# Patient Record
Sex: MALE | Race: WHITE | NOT HISPANIC OR LATINO | URBAN - METROPOLITAN AREA
[De-identification: names, ages, dates, MRNs, and addresses within clinical notes are randomized per-mention and may not be internally consistent; named-entity substitution may affect disease eponyms.]

---

## 2021-10-04 ENCOUNTER — OFFICE VISIT (OUTPATIENT)
Dept: URBAN - METROPOLITAN AREA CLINIC 105 | Facility: CLINIC | Age: 41
End: 2021-10-04
Payer: COMMERCIAL

## 2021-10-04 VITALS
BODY MASS INDEX: 20.4 KG/M2 | SYSTOLIC BLOOD PRESSURE: 139 MMHG | HEART RATE: 76 BPM | HEIGHT: 68 IN | DIASTOLIC BLOOD PRESSURE: 85 MMHG | TEMPERATURE: 97.6 F | WEIGHT: 134.6 LBS

## 2021-10-04 DIAGNOSIS — R19.4 CHANGE IN BOWEL HABIT: ICD-10-CM

## 2021-10-04 PROCEDURE — 99242 OFF/OP CONSLTJ NEW/EST SF 20: CPT | Performed by: INTERNAL MEDICINE

## 2021-10-04 RX ORDER — BACILLUS COAGULANS/INULIN 1B-250 MG
AS DIRECTED CAPSULE ORAL
Status: ACTIVE | COMMUNITY

## 2021-10-04 NOTE — HPI-TODAY'S VISIT:
Pt comes on referral from Dr. Campos Lizama. A copy will be sent to the referring MD. Pt tells me that over the last 4 months he has had change in bowel habits. he says that the stools are somewhat flatter than normal. He says that it is not diarrhea but looser. he says that it is not one piece any more. there is no liquid component at all but he will have a stool with no "bulk".  no blood in the stool. - He denies weight loss or vomiting. no blood in the stool. he has developed some LLQ "achiness". she says that his sister has ulcerative colitis.

## 2021-10-05 ENCOUNTER — OFFICE VISIT (OUTPATIENT)
Dept: URBAN - METROPOLITAN AREA CLINIC 105 | Facility: CLINIC | Age: 41
End: 2021-10-05

## 2021-12-06 ENCOUNTER — OFFICE VISIT (OUTPATIENT)
Dept: URBAN - METROPOLITAN AREA CLINIC 105 | Facility: CLINIC | Age: 41
End: 2021-12-06
Payer: COMMERCIAL

## 2021-12-06 VITALS
DIASTOLIC BLOOD PRESSURE: 82 MMHG | HEART RATE: 71 BPM | SYSTOLIC BLOOD PRESSURE: 141 MMHG | BODY MASS INDEX: 20.88 KG/M2 | HEIGHT: 68 IN | WEIGHT: 137.8 LBS | TEMPERATURE: 97.8 F

## 2021-12-06 DIAGNOSIS — R19.5 LOOSE STOOLS: ICD-10-CM

## 2021-12-06 DIAGNOSIS — Z83.79 FAMILY HISTORY OF INFLAMMATORY BOWEL DISEASE: ICD-10-CM

## 2021-12-06 DIAGNOSIS — R19.4 CHANGE IN BOWEL HABITS: ICD-10-CM

## 2021-12-06 DIAGNOSIS — L29.0 ANAL ITCH: ICD-10-CM

## 2021-12-06 PROCEDURE — 99213 OFFICE O/P EST LOW 20 MIN: CPT | Performed by: INTERNAL MEDICINE

## 2021-12-06 RX ORDER — BACILLUS COAGULANS/INULIN 1B-250 MG
AS DIRECTED CAPSULE ORAL
Status: ACTIVE | COMMUNITY

## 2021-12-06 NOTE — HPI-TODAY'S VISIT:
Pt comes on referral from Dr. Campos Lizama. A copy will be sent to the referring MD. Pt tells me that over the last 4 months he has had change in bowel habits. he says that the stools are somewhat flatter than normal. He says that it is not diarrhea but looser. he says that it is not one piece any more. there is no liquid component at all but he will have a stool with no "bulk".  no blood in the stool. - He denies weight loss or vomiting. no blood in the stool. he has developed some LLQ "achiness". she says that his sister has ulcerative colitis. - 12/7/2021: At his last visit, we offered colonoscopy but he declined. i started him on equalactin that he did not consistently take but he did do the probiotic.

## 2021-12-09 ENCOUNTER — LAB OUTSIDE AN ENCOUNTER (OUTPATIENT)
Dept: URBAN - METROPOLITAN AREA CLINIC 105 | Facility: CLINIC | Age: 41
End: 2021-12-09

## 2021-12-15 LAB
CALPROTECTIN, STOOL - QDX: (no result)
GASTROINTESTINAL PATHOGEN: (no result)
PANCREATICELASTASE ELISA, STOOL: (no result)

## 2022-01-18 ENCOUNTER — OFFICE VISIT (OUTPATIENT)
Dept: URBAN - METROPOLITAN AREA CLINIC 105 | Facility: CLINIC | Age: 42
End: 2022-01-18

## 2022-02-04 ENCOUNTER — OFFICE VISIT (OUTPATIENT)
Dept: URBAN - METROPOLITAN AREA CLINIC 105 | Facility: CLINIC | Age: 42
End: 2022-02-04
Payer: COMMERCIAL

## 2022-02-04 ENCOUNTER — WEB ENCOUNTER (OUTPATIENT)
Dept: URBAN - METROPOLITAN AREA CLINIC 105 | Facility: CLINIC | Age: 42
End: 2022-02-04

## 2022-02-04 VITALS
DIASTOLIC BLOOD PRESSURE: 85 MMHG | WEIGHT: 141.6 LBS | SYSTOLIC BLOOD PRESSURE: 144 MMHG | BODY MASS INDEX: 21.46 KG/M2 | TEMPERATURE: 96.9 F | HEART RATE: 78 BPM | HEIGHT: 68 IN

## 2022-02-04 DIAGNOSIS — R19.5 CHANGE IN STOOL CALIBER: ICD-10-CM

## 2022-02-04 DIAGNOSIS — R19.4 CHANGE IN STOOL HABITS: ICD-10-CM

## 2022-02-04 PROCEDURE — 99213 OFFICE O/P EST LOW 20 MIN: CPT | Performed by: INTERNAL MEDICINE

## 2022-02-04 RX ORDER — SODIUM, POTASSIUM,MAG SULFATES 17.5-3.13G
177 ML SOLUTION, RECONSTITUTED, ORAL ORAL
Qty: 1 KIT | Refills: 0 | OUTPATIENT
Start: 2022-02-04

## 2022-02-04 RX ORDER — BISACODYL 5 MG
TAKE 4 TABLET, DELAYED RELEASE (ENTERIC COATED) ORAL
Qty: 4 | OUTPATIENT
Start: 2022-02-04 | End: 2022-02-05

## 2022-02-04 RX ORDER — BACILLUS COAGULANS/INULIN 1B-250 MG
AS DIRECTED CAPSULE ORAL
Status: ACTIVE | COMMUNITY

## 2022-02-04 NOTE — HPI-TODAY'S VISIT:
Pt comes on referral from Dr. Campos Lizama. A copy will be sent to the referring MD. Pt tells me that over the last 4 months he has had change in bowel habits. he says that the stools are somewhat flatter than normal. He says that it is not diarrhea but looser. he says that it is not one piece any more. there is no liquid component at all but he will have a stool with no "bulk".  no blood in the stool. - He denies weight loss or vomiting. no blood in the stool. he has developed some LLQ "achiness". she says that his sister has ulcerative colitis. - 12/7/2021: At his last visit, we offered colonoscopy but he declined. i started him on equalactin that he did not consistently take but he did do the probiotic. - 2/4/2022: stool studies

## 2022-02-09 ENCOUNTER — WEB ENCOUNTER (OUTPATIENT)
Dept: URBAN - METROPOLITAN AREA CLINIC 105 | Facility: CLINIC | Age: 42
End: 2022-02-09

## 2022-05-03 PROBLEM — 46202007: Status: ACTIVE | Noted: 2022-05-03

## 2022-05-23 ENCOUNTER — CLAIMS CREATED FROM THE CLAIM WINDOW (OUTPATIENT)
Dept: URBAN - METROPOLITAN AREA SURGERY CENTER 16 | Facility: SURGERY CENTER | Age: 42
End: 2022-05-23

## 2022-05-23 ENCOUNTER — CLAIMS CREATED FROM THE CLAIM WINDOW (OUTPATIENT)
Dept: URBAN - METROPOLITAN AREA SURGERY CENTER 16 | Facility: SURGERY CENTER | Age: 42
End: 2022-05-23
Payer: COMMERCIAL

## 2022-05-23 DIAGNOSIS — R19.4 ALTERATION IN BOWEL ELIMINATION: ICD-10-CM

## 2022-05-23 PROCEDURE — 45378 DIAGNOSTIC COLONOSCOPY: CPT | Performed by: INTERNAL MEDICINE

## 2022-05-23 PROCEDURE — G8907 PT DOC NO EVENTS ON DISCHARG: HCPCS | Performed by: INTERNAL MEDICINE

## 2022-12-13 ENCOUNTER — DASHBOARD ENCOUNTERS (OUTPATIENT)
Age: 42
End: 2022-12-13

## 2022-12-13 ENCOUNTER — WEB ENCOUNTER (OUTPATIENT)
Dept: URBAN - METROPOLITAN AREA CLINIC 105 | Facility: CLINIC | Age: 42
End: 2022-12-13

## 2022-12-13 ENCOUNTER — OFFICE VISIT (OUTPATIENT)
Dept: URBAN - METROPOLITAN AREA CLINIC 105 | Facility: CLINIC | Age: 42
End: 2022-12-13
Payer: COMMERCIAL

## 2022-12-13 VITALS
HEIGHT: 68 IN | HEART RATE: 76 BPM | SYSTOLIC BLOOD PRESSURE: 155 MMHG | BODY MASS INDEX: 21.49 KG/M2 | DIASTOLIC BLOOD PRESSURE: 83 MMHG | WEIGHT: 141.8 LBS | TEMPERATURE: 97.1 F

## 2022-12-13 DIAGNOSIS — R15.1 FECAL SMEARING: ICD-10-CM

## 2022-12-13 DIAGNOSIS — R10.11 RIGHT UPPER QUADRANT PAIN: ICD-10-CM

## 2022-12-13 DIAGNOSIS — R19.4 CHANGE IN BOWEL HABITS: ICD-10-CM

## 2022-12-13 DIAGNOSIS — M25.511 RIGHT SHOULDER PAIN, UNSPECIFIED CHRONICITY: ICD-10-CM

## 2022-12-13 DIAGNOSIS — Z87.19 HISTORY OF IBS: ICD-10-CM

## 2022-12-13 PROCEDURE — 99213 OFFICE O/P EST LOW 20 MIN: CPT | Performed by: INTERNAL MEDICINE

## 2022-12-13 RX ORDER — BACILLUS COAGULANS/INULIN 1B-250 MG
AS DIRECTED CAPSULE ORAL
Status: ON HOLD | COMMUNITY

## 2022-12-13 RX ORDER — SODIUM, POTASSIUM,MAG SULFATES 17.5-3.13G
177 ML SOLUTION, RECONSTITUTED, ORAL ORAL
Qty: 1 KIT | Refills: 0 | Status: ON HOLD | COMMUNITY
Start: 2022-02-04

## 2022-12-13 NOTE — HPI-TODAY'S VISIT:
Pt comes on referral from Dr. Campos Lizama. A copy will be sent to the referring MD. Pt tells me that over the last 4 months he has had change in bowel habits. he says that the stools are somewhat flatter than normal. He says that it is not diarrhea but looser. he says that it is not one piece any more. there is no liquid component at all but he will have a stool with no "bulk".  no blood in the stool. - He denies weight loss or vomiting. no blood in the stool. he has developed some LLQ "achiness". she says that his sister has ulcerative colitis. - 12/7/2021: At his last visit, we offered colonoscopy but he declined. i started him on equalactin that he did not consistently take but he did do the probiotic. - 2/4/2022: stool studies negative - 12/13/2022: in May of 2022 which was normal. we put him on miralax and probiotic. he felt really cleaned out after the colonoscopy for about 2 months. - having RUQ Pain and strong FH of gallbladder issues.

## 2022-12-14 LAB
TISSUE TRANSGLUTAMINASE AB, IGA: <1
TISSUE TRANSGLUTAMINASE AB, IGG: <1

## 2022-12-16 ENCOUNTER — TELEPHONE ENCOUNTER (OUTPATIENT)
Dept: URBAN - METROPOLITAN AREA CLINIC 105 | Facility: CLINIC | Age: 42
End: 2022-12-16

## 2023-01-05 ENCOUNTER — OFFICE VISIT (OUTPATIENT)
Dept: URBAN - METROPOLITAN AREA CLINIC 91 | Facility: CLINIC | Age: 43
End: 2023-01-05
Payer: COMMERCIAL

## 2023-01-05 DIAGNOSIS — K58.1 IBS: ICD-10-CM

## 2023-01-05 PROCEDURE — 76700 US EXAM ABDOM COMPLETE: CPT | Performed by: INTERNAL MEDICINE

## 2023-01-06 PROBLEM — 10743008 IRRITABLE BOWEL SYNDROME: Status: ACTIVE | Noted: 2023-01-06

## 2023-07-08 NOTE — PHYSICAL EXAM CHEST:
no lesions,  no deformities,  no traumatic injuries,  no significant scars are present,  chest wall non-tender,  no masses present, breathing is unlabored without accessory muscle use, normal breath sounds Performed

## 2024-01-22 ENCOUNTER — WEB ENCOUNTER (OUTPATIENT)
Dept: URBAN - METROPOLITAN AREA CLINIC 105 | Facility: CLINIC | Age: 44
End: 2024-01-22

## 2024-03-19 ENCOUNTER — OV EP (OUTPATIENT)
Dept: URBAN - METROPOLITAN AREA CLINIC 105 | Facility: CLINIC | Age: 44
End: 2024-03-19

## 2024-10-15 ENCOUNTER — OFFICE VISIT (OUTPATIENT)
Dept: URBAN - METROPOLITAN AREA CLINIC 105 | Facility: CLINIC | Age: 44
End: 2024-10-15

## 2024-10-15 NOTE — HPI-TODAY'S VISIT:
Pt comes on referral from Dr. Campos Lizama. A copy will be sent to the referring MD. Pt tells me that over the last 4 months he has had change in bowel habits. he says that the stools are somewhat flatter than normal. He says that it is not diarrhea but looser. he says that it is not one piece any more. there is no liquid component at all but he will have a stool with no "bulk".  no blood in the stool. - He denies weight loss or vomiting. no blood in the stool. he has developed some LLQ "achiness". she says that his sister has ulcerative colitis. - 12/7/2021: At his last visit, we offered colonoscopy but he declined. i started him on equalactin that he did not consistently take but he did do the probiotic. - 2/4/2022: stool studies negative - 12/13/2022: in May of 2022 which was normal. we put him on miralax and probiotic. he felt really cleaned out after the colonoscopy for about 2 months. - having RUQ Pain and strong FH of gallbladder issues. - 10/15/2024 Pt presents to discuss "digestive problem." He is s/p colonoscopy in 5/2022. At last visit, celiac panel ordered. Patient was recommended fiber supplement. RUQ US ordered for evaluation of intermittent RUQ pain, and this was normal.    Labs 1/31/2024:GGT 67. CMP, CBC, TSH normal. A1c 5.4. Colonoscopy 5/23/2022:Normal examination. Eric Cox

## 2024-10-23 ENCOUNTER — OFFICE VISIT (OUTPATIENT)
Dept: URBAN - METROPOLITAN AREA CLINIC 105 | Facility: CLINIC | Age: 44
End: 2024-10-23
Payer: COMMERCIAL

## 2024-10-23 VITALS
BODY MASS INDEX: 21.82 KG/M2 | HEART RATE: 88 BPM | WEIGHT: 144 LBS | TEMPERATURE: 97.2 F | SYSTOLIC BLOOD PRESSURE: 154 MMHG | HEIGHT: 68 IN | DIASTOLIC BLOOD PRESSURE: 85 MMHG

## 2024-10-23 DIAGNOSIS — R19.4 CHANGE IN BOWEL HABITS: ICD-10-CM

## 2024-10-23 DIAGNOSIS — Z87.19 HISTORY OF IBS: ICD-10-CM

## 2024-10-23 DIAGNOSIS — R15.1 FECAL SMEARING: ICD-10-CM

## 2024-10-23 PROCEDURE — 99214 OFFICE O/P EST MOD 30 MIN: CPT | Performed by: INTERNAL MEDICINE

## 2024-10-23 RX ORDER — ESCITALOPRAM OXALATE 10 MG/1
1 TABLET TABLET, FILM COATED ORAL ONCE A DAY
Status: ACTIVE | COMMUNITY

## 2024-10-23 NOTE — HPI-TODAY'S VISIT:
Pt comes on referral from Dr. Campos Lizama. A copy will be sent to the referring MD. Pt tells me that over the last 4 months he has had change in bowel habits. he says that the stools are somewhat flatter than normal. He says that it is not diarrhea but looser. he says that it is not one piece any more. there is no liquid component at all but he will have a stool with no "bulk".  no blood in the stool. - He denies weight loss or vomiting. no blood in the stool. he has developed some LLQ "achiness". she says that his sister has ulcerative colitis. - 12/7/2021: At his last visit, we offered colonoscopy but he declined. i started him on equalactin that he did not consistently take but he did do the probiotic. - 2/4/2022: stool studies negative - 12/13/2022: in May of 2022 which was normal. we put him on miralax and probiotic. he felt really cleaned out after the colonoscopy for about 2 months. - having RUQ Pain and strong FH of gallbladder issues. - 10/23/2024 Pt presents to discuss "digestive problem." He is s/p colonoscopy in 5/2022. At last visit, celiac testing ordered and negative. Patient was recommended fiber supplement. RUQ US ordered for evaluation of intermittent RUQ pain, and this was normal. Today, pt states he has ongoing issues with "strange" BMs. He has 1-2 BM/day. Easy to pass. No blood in the stool. Stools are formed, and not watery/liquidy. Not as solid as they used to be. States a lot of the time, his stool will be floating. He notes a lot of gas as well, particularly after meals. He is unsure about sensation of complete evacuation.  States he has tried probiotics and fiber without relief. Had colonoscopy which was normal. He went to nutritionist; he tried gluten free diet but didn't seem to make much difference.  He feels that he has problems with digesting, but is unsure what. States he has issues "staying clean," as in it takes a lot of wiping or even taking a shower to clean. He has noticed some minor leakage of stool which he may notice when he wipes related to urinating. He admits that he doesn't drink much water throughout the day. He notes that symptoms are typically better when he is on vacation. Every now and then will have upper abdominal pain, but doesn't correlate with bowel movements and is not particularly bothersome. Denies N/V, heartburn.     Labs 1/31/2024: GGT 67. CMP, CBC, TSH normal. A1c 5.4. 12/13/2022: Tissue transglutaminase IgG and IgA negative.

## 2024-10-24 LAB
IMMUNOGLOBULIN A, QN, SERUM: 99
T-TRANSGLUTAMINASE (TTG) IGA: <1

## 2024-12-04 ENCOUNTER — OFFICE VISIT (OUTPATIENT)
Dept: URBAN - METROPOLITAN AREA CLINIC 105 | Facility: CLINIC | Age: 44
End: 2024-12-04

## 2024-12-04 RX ORDER — ESCITALOPRAM OXALATE 10 MG/1
1 TABLET TABLET, FILM COATED ORAL ONCE A DAY
COMMUNITY